# Patient Record
Sex: FEMALE | Race: WHITE
[De-identification: names, ages, dates, MRNs, and addresses within clinical notes are randomized per-mention and may not be internally consistent; named-entity substitution may affect disease eponyms.]

---

## 2023-02-27 ENCOUNTER — HOSPITAL ENCOUNTER (EMERGENCY)
Dept: HOSPITAL 54 - ER | Age: 48
Discharge: HOME | End: 2023-02-27
Payer: COMMERCIAL

## 2023-02-27 VITALS — BODY MASS INDEX: 43.63 KG/M2 | WEIGHT: 278 LBS | HEIGHT: 67 IN

## 2023-02-27 VITALS — DIASTOLIC BLOOD PRESSURE: 68 MMHG | SYSTOLIC BLOOD PRESSURE: 124 MMHG

## 2023-02-27 DIAGNOSIS — N83.209: ICD-10-CM

## 2023-02-27 DIAGNOSIS — R10.32: ICD-10-CM

## 2023-02-27 DIAGNOSIS — Z20.822: ICD-10-CM

## 2023-02-27 DIAGNOSIS — Z90.710: ICD-10-CM

## 2023-02-27 DIAGNOSIS — R10.12: Primary | ICD-10-CM

## 2023-02-27 DIAGNOSIS — Z88.5: ICD-10-CM

## 2023-02-27 LAB
ALBUMIN SERPL BCP-MCNC: 3.4 G/DL (ref 3.4–5)
ALP SERPL-CCNC: 170 U/L (ref 46–116)
ALT SERPL W P-5'-P-CCNC: 37 U/L (ref 12–78)
AST SERPL W P-5'-P-CCNC: 26 U/L (ref 15–37)
BASOPHILS # BLD AUTO: 0 K/UL (ref 0–0.2)
BASOPHILS NFR BLD AUTO: 0.2 % (ref 0–2)
BILIRUB DIRECT SERPL-MCNC: 0.1 MG/DL (ref 0–0.2)
BILIRUB SERPL-MCNC: 0.4 MG/DL (ref 0.2–1)
BILIRUB UR QL STRIP: NEGATIVE
BUN SERPL-MCNC: 9 MG/DL (ref 7–18)
CALCIUM SERPL-MCNC: 8.9 MG/DL (ref 8.5–10.1)
CHLORIDE SERPL-SCNC: 103 MMOL/L (ref 98–107)
CO2 SERPL-SCNC: 26 MMOL/L (ref 21–32)
COLOR UR: YELLOW
CREAT SERPL-MCNC: 0.9 MG/DL (ref 0.6–1.3)
DEPRECATED SQUAMOUS URNS QL MICRO: (no result) /HPF
EOSINOPHIL NFR BLD AUTO: 1.4 % (ref 0–6)
GLUCOSE SERPL-MCNC: 116 MG/DL (ref 74–106)
GLUCOSE UR STRIP-MCNC: NEGATIVE MG/DL
HCT VFR BLD AUTO: 37 % (ref 33–45)
HGB BLD-MCNC: 11.6 G/DL (ref 11.5–14.8)
LEUKOCYTE ESTERASE UR QL STRIP: (no result)
LIPASE SERPL-CCNC: 125 U/L (ref 73–393)
LYMPHOCYTES NFR BLD AUTO: 1.9 K/UL (ref 0.8–4.8)
LYMPHOCYTES NFR BLD AUTO: 19.3 % (ref 20–44)
MCHC RBC AUTO-ENTMCNC: 32 G/DL (ref 31–36)
MCV RBC AUTO: 83 FL (ref 82–100)
MONOCYTES NFR BLD AUTO: 0.6 K/UL (ref 0.1–1.3)
MONOCYTES NFR BLD AUTO: 5.8 % (ref 2–12)
NEUTROPHILS # BLD AUTO: 7.2 K/UL (ref 1.8–8.9)
NEUTROPHILS NFR BLD AUTO: 73.3 % (ref 43–81)
NITRITE UR QL STRIP: NEGATIVE
PH UR STRIP: 7 [PH] (ref 5–8)
PLATELET # BLD AUTO: 371 K/UL (ref 150–450)
POTASSIUM SERPL-SCNC: 3.7 MMOL/L (ref 3.5–5.1)
PROT SERPL-MCNC: 7.9 G/DL (ref 6.4–8.2)
PROT UR QL STRIP: NEGATIVE MG/DL
RBC # BLD AUTO: 4.41 MIL/UL (ref 4–5.2)
RBC #/AREA URNS HPF: (no result) /HPF (ref 0–2)
SODIUM SERPL-SCNC: 137 MMOL/L (ref 136–145)
UROBILINOGEN UR STRIP-MCNC: 0.2 EU/DL
WBC #/AREA URNS HPF: (no result) /HPF
WBC NRBC COR # BLD AUTO: 9.9 K/UL (ref 4.3–11)

## 2023-02-27 PROCEDURE — 76856 US EXAM PELVIC COMPLETE: CPT

## 2023-02-27 PROCEDURE — 83690 ASSAY OF LIPASE: CPT

## 2023-02-27 PROCEDURE — 96365 THER/PROPH/DIAG IV INF INIT: CPT

## 2023-02-27 PROCEDURE — 80048 BASIC METABOLIC PNL TOTAL CA: CPT

## 2023-02-27 PROCEDURE — 36415 COLL VENOUS BLD VENIPUNCTURE: CPT

## 2023-02-27 PROCEDURE — 87426 SARSCOV CORONAVIRUS AG IA: CPT

## 2023-02-27 PROCEDURE — 99285 EMERGENCY DEPT VISIT HI MDM: CPT

## 2023-02-27 PROCEDURE — 84703 CHORIONIC GONADOTROPIN ASSAY: CPT

## 2023-02-27 PROCEDURE — C9803 HOPD COVID-19 SPEC COLLECT: HCPCS

## 2023-02-27 PROCEDURE — 85025 COMPLETE CBC W/AUTO DIFF WBC: CPT

## 2023-02-27 PROCEDURE — 81001 URINALYSIS AUTO W/SCOPE: CPT

## 2023-02-27 PROCEDURE — 96376 TX/PRO/DX INJ SAME DRUG ADON: CPT

## 2023-02-27 PROCEDURE — 87086 URINE CULTURE/COLONY COUNT: CPT

## 2023-02-27 PROCEDURE — 74150 CT ABDOMEN W/O CONTRAST: CPT

## 2023-02-27 PROCEDURE — 96375 TX/PRO/DX INJ NEW DRUG ADDON: CPT

## 2023-02-27 PROCEDURE — 85730 THROMBOPLASTIN TIME PARTIAL: CPT

## 2023-02-27 PROCEDURE — 80076 HEPATIC FUNCTION PANEL: CPT

## 2023-02-27 NOTE — NUR
CLINICALS WERE FAXED TO [facility] CARE COORDINATOR AT [facility]. [phone]

## 2023-02-27 NOTE — NUR
CALLED [facility]SFER LINE REGARDING PT POSSIBLE TRANSFER TO [facility] 
PER DR. ESPINOZA CISNEROS (PRIMARY OBGYN)  REQUEST. 

FAXED PT CLINICALS [phone]

AWAITING A CALL BACK REGARDING TRANSFER UPDATE

## 2023-02-27 NOTE — NUR
medicated as ordered. i informed dr. sotomayor regarding the pt's allergy to 
morphine. dr. sotomayor at bedside. dilaudid given with dosage as ordered.